# Patient Record
Sex: MALE | Race: BLACK OR AFRICAN AMERICAN | NOT HISPANIC OR LATINO
[De-identification: names, ages, dates, MRNs, and addresses within clinical notes are randomized per-mention and may not be internally consistent; named-entity substitution may affect disease eponyms.]

---

## 2020-08-03 PROBLEM — Z00.00 ENCOUNTER FOR PREVENTIVE HEALTH EXAMINATION: Status: ACTIVE | Noted: 2020-08-03

## 2020-08-04 ENCOUNTER — APPOINTMENT (OUTPATIENT)
Dept: SPEECH THERAPY | Facility: HOSPITAL | Age: 85
End: 2020-08-04
Payer: MEDICARE

## 2020-09-03 ENCOUNTER — APPOINTMENT (OUTPATIENT)
Dept: SPEECH THERAPY | Facility: HOSPITAL | Age: 85
End: 2020-09-03

## 2020-09-03 ENCOUNTER — OUTPATIENT (OUTPATIENT)
Dept: OUTPATIENT SERVICES | Facility: HOSPITAL | Age: 85
LOS: 1 days | End: 2020-09-03

## 2020-09-22 ENCOUNTER — APPOINTMENT (OUTPATIENT)
Dept: SPEECH THERAPY | Facility: HOSPITAL | Age: 85
End: 2020-09-22

## 2020-09-22 ENCOUNTER — APPOINTMENT (OUTPATIENT)
Dept: RADIOLOGY | Facility: HOSPITAL | Age: 85
End: 2020-09-22

## 2020-09-22 DIAGNOSIS — R13.10 DYSPHAGIA, UNSPECIFIED: ICD-10-CM

## 2020-09-22 PROCEDURE — 74230 X-RAY XM SWLNG FUNCJ C+: CPT | Mod: 26

## 2020-09-22 NOTE — REASON FOR VISIT
[Initial] : initial visit for [Modified Barium Swallow] : Modified Barium Swallow [Formal Caregiver] : formal caregiver

## 2020-09-22 NOTE — ASSESSMENT
[FreeTextEntry1] : MODIFIED BARIUM SWALLOW STUDY\par \par Date of Report: 9/22/20\par Date of Evaluation: 9/22/20\par Patient Name: Hugo Saunders\par YOB: 1934\par Primary Diagnosis: Oropharyngeal Dysphagia\par Treatment Diagnosis:  Oropharyngeal Dysphagia\par Referring Physician:  Dr. Felix Barnett\par \par Results:\par 1.There was no laryngeal penetration/aspiration observed before, during or after the swallow for puree consistency, ¼ inch solid, honey thick liquids and nectar thick liquids\par 2.There was trace SILENT laryngeal penetration during the swallow migrating to the level of the vocal cords without retrieval for thin liquids. \par \par Of Note, Aspiration cannot be ruled out given view of trachea was limited due to patient’s body habitus (should girdling) despite multiple attempts at repositioning. \par \par History:  Information on this patient has been provided by Nursing Home documentation: This 86 year old male was seen for a Modified Barium Swallow Study rule out aspiration, assess for diet texture change as appropriate, and/or explore positional strategies and/or compensatory techniques to eliminate aspiration.  \par \par Reason For Referral: To determine patient's ability to safely tolerate an oral diet.\par \par Current Nutritional Intake: Puree Consistency and Thin Liquids per Nursing Home documentation \par \par Medical History: Per charting, the patient’s medical history is significant for: Peripheral Vascular Disease, Muscle Weakness, Oropharyngeal Dysphagia, Osteoarthritis, HTN, COVID-19, Atherosclerotic Heart Disease, Chronic Kidney Disease, Benign Hyperplasia, Angina Pectoris, Acute Myocardial Infarction, GERD, and Dementia. \par \par ASSESSMENT\par The patient was assessed seated in the lateral plane in the [facility] Suite, with Radiologist present.  The patient was alert and cooperative. Secretion management was adequate. There was no coughing, throat clearing or wet/gurgly vocal quality prior to test administration. Of Note, patient arrives to today’s study without full upper and lower dentures and in completely edentulous state. Patient reports dentures are back at the Nursing Home. \par \par Consistencies Administered:  \par Solids:  Puree Consistency and ¼ Inch Solid \par Liquids:  Honey Thick Liquids, Nectar Thick Liquids and Thin Liquids\par \par SUMMARY & IMPRESSION\par Videofluoroscoopic Evaluation reveals:\par Patient presents with Moderate to Severe Oropharyngeal Dysphagia. The Oral Phase was marked by adequate stripping of bolus from utensil, adequate oral containment, extended mastication for ¼ inch solid likely exacerbated by edentulous state, reduced bolus manipulation and reduced anterior to posterior transport. Reduced oral clearance for ¼ inch solids marked by mild to moderate lingual residue post primary swallow. The Pharyngeal Phase was marked by delay in initiation of pharyngeal swallow (head of bolus at level of pyriforms for thin liquids), reduced laryngeal elevation, reduced base of tongue retraction, reduced epiglottic deflection, reduced laryngeal vestibular closure and reduced pharyngeal contractility. Reduced pharyngeal clearance marked by mild to moderate residue primarily located along base of tongue surface, in valleculae and in pyriforms post primary swallow. Patient inconsistently able to follow directives to complete secondary re-swallow and to consume small sips which aides in oropharyngeal clearance. There was trace SILENT laryngeal penetration during the swallow migrating to the level of the vocal cords without retrieval for thin liquids. Reduced laryngeal sensation given no reflexive cough response to deep laryngeal penetration. There was no laryngeal penetration/aspiration observed before, during or after the swallow for puree consistency, ¼ inch solid, honey thick liquid and nectar thick liquid. Of Note, patient noted with large self-administered cup sips and required maximum verbal prompting to consume small sips. \par \par Of Note, Aspiration for Thin Liquids cannot be ruled out given view of trachea was limited due to patient’s body habitus (should girdle) despite multiple attempts at repositioning. \par \par #1 Puree Consistency, ¼ Inch Solid, Honey Thick Liquids and Nectar Thick Liquids\par #5 Thin Liquids\par Aspiration - Penetration Scale    (Sheriebek et al Dysphagia 11:93-98 (April 1996), Aspiration-Penetration Scale)\par 1.    Material does not enter the airway\par 2.    Material enters the airway, remains above the vocal folds, and is ejected from the airway\par 3.    Material enters the airway, remains above the vocal folds, and is not ejected\par 4.    Material enters the airway, contacts the vocal folds, and is ejected from the airway\par 5.    Material enters the airway, contacts the vocal folds, and is not ejected from the airway\par 6.    Material enters the airway, passes below the vocal folds and is ejected into the larynx or out of the airway\par 7.    Material enters the airway, passes below the vocal folds, and is not ejected from the trachea despite effort\par 8.    Material enters the airway, passes below the vocal folds, and no effort is made to eject\par \par Recommendations:\par 1.Puree Consistency and [place] Thick Liquids via Small Cup Sips Only \par 2.Secondary Re-Swallow After Every Sip/Bite\par 3.Follow Safe Swallow Guidelines including small/single cup sips, no straws, secondary re-swallow, alternate liquids and solids, upright position during PO intake, maintain upright position 30 minutes post PO intake and maintain good oral hygiene\par 4.Aspiration Precautions\par 5.Follow up with your physician\par 6.Swallow Therapy to maximize swallow function and to ensure implementation of abovementioned strategies \par \par Should you have any additional concerns, please contact the Center at [phone].\par \par Milly Erickson MA CCC-SLP\par Speech-Language Pathologist\par [facility] \par